# Patient Record
(demographics unavailable — no encounter records)

---

## 2025-03-11 NOTE — PHYSICAL EXAM
[de-identified] : General: No acute distress Resp: Nonlabored Cards; WWP     Left ring and middle fingers Skin: Intact, no ecchymosis, + swelling  PIPJ , worse in the ring compared to the middle No tenderness dorsally or volarly but tenderness over both collateral ligaments.  There is no instability on testing of the collateral ligaments Motor: Fires FDS/FDP/EDC to finger Full Abduction and adduction of the fingers ROM: PIPJ  0-100 DIPJ:  0-80 MCPJ:  0-90 Can make a full fist but with pain and effort Can extend all fingers No numbness or tingling WWP  [de-identified] :  3 views of the left hand were obtained and reviewed in clinic showing no fractures or dislocations, mild CMC joint arthritis and very mild arthritis in the DIP joints of the ring and middle finger, no lesions, and no soft tissue abnormalities

## 2025-03-11 NOTE — HISTORY OF PRESENT ILLNESS
[Right] : right hand dominant [FreeTextEntry1] : Patient presents today for evaluation of left middle and ring finger injury sustained from a fall in December.  She was seen at the emergency room where she got x-rays which demonstrated no fracture. She reports that she cannot bend the fingers and has been wearing her son's splint which helps keep the finger immobilized.  She also notices swelling of the left ring finger.  No triggering or locking of the digits but has pain with making a full fist

## 2025-03-11 NOTE — ASSESSMENT
[FreeTextEntry1] : 55-year-old female with left ring and middle PIP joint sprains    -Discussed diagnosis, natural history of condition, and treatment options -Voltaren gel, ice at night heat in the morning -No splinting range of motion as tolerated -Tylenol Motrin as needed -OT prescription sent to work on range of motion exercises -If no improvement in 6 to 8 weeks return for repeat evaluation -All questions answered, patient in agreement

## 2025-06-03 NOTE — HISTORY OF PRESENT ILLNESS
[FreeTextEntry1] : FRANCISCO FIGUEREDO is a 55-year-old right hand dominant female. Patient presents today for evaluation of left middle and ring finger. pt states that she has completed 12 sessions of physical therapy but she still has pain with her range of motion is improving.  Does have a history of diabetes

## 2025-06-03 NOTE — ASSESSMENT
[FreeTextEntry1] : 55-year-old female with left ring and middle PIP joint sprains  A second occupational therapy prescription was provided to the patient today so she can continue with range of motion exercises.  I discussed the benefit of a Medrol Dosepak to help improve the pain and work on range of motion.  She does have a history of diabetes so I told her this can elevate her sugars so she has to be diligent in staying on top of her diet and sugar control.  She continue with Voltaren gel and no restrictions in terms of range of motion.  If no improvement in 2 months time she will return at which point we could consider an MRI but I do not feel this will be necessary. -All questions answered, patient in agreement

## 2025-06-03 NOTE — PHYSICAL EXAM
[de-identified] : General: No acute distress Resp: Nonlabored Cards; WWP     Left ring and middle fingers Skin: Intact, no ecchymosis, no longer has any swelling Some minor tenderness dorsally around the DIP joints today Motor: Fires FDS/FDP/EDC to finger Full Abduction and adduction of the fingers ROM: PIPJ  0-110 DIPJ:  0-80 MCPJ:  0-90 Can make a full fist with less pain and effort compared to prior Can extend all fingers No numbness or tingling WWP No instability on stressing the collaterals